# Patient Record
Sex: FEMALE | Race: WHITE | NOT HISPANIC OR LATINO | Employment: UNEMPLOYED | URBAN - METROPOLITAN AREA
[De-identification: names, ages, dates, MRNs, and addresses within clinical notes are randomized per-mention and may not be internally consistent; named-entity substitution may affect disease eponyms.]

---

## 2023-09-20 PROBLEM — Z96.1 PSEUDOPHAKIA OF RIGHT EYE: Status: ACTIVE | Noted: 2023-09-20

## 2023-09-20 PROBLEM — H26.9 CATARACT OF LEFT EYE: Status: ACTIVE | Noted: 2023-09-20

## 2023-09-20 PROBLEM — H40.1133 PRIMARY OPEN ANGLE GLAUCOMA OF BOTH EYES, SEVERE STAGE: Status: ACTIVE | Noted: 2023-09-20

## 2023-09-20 PROBLEM — H02.89 IRRITATION OF EYELID: Status: ACTIVE | Noted: 2023-09-20

## 2023-09-20 RX ORDER — DORZOLAMIDE HYDROCHLORIDE AND TIMOLOL MALEATE 20; 5 MG/ML; MG/ML
1 SOLUTION/ DROPS OPHTHALMIC 2 TIMES DAILY
COMMUNITY
Start: 2014-03-04 | End: 2023-10-19 | Stop reason: SDUPTHER

## 2023-09-20 RX ORDER — POTASSIUM CHLORIDE 750 MG/1
TABLET, FILM COATED, EXTENDED RELEASE ORAL
COMMUNITY
Start: 2019-10-29

## 2023-09-20 RX ORDER — BRIMONIDINE TARTRATE 1 MG/ML
1 SOLUTION/ DROPS OPHTHALMIC EVERY 12 HOURS
COMMUNITY
Start: 2021-04-08 | End: 2023-10-19 | Stop reason: SDUPTHER

## 2023-09-20 RX ORDER — DILTIAZEM HYDROCHLORIDE 240 MG/1
CAPSULE, EXTENDED RELEASE ORAL
COMMUNITY
Start: 2014-03-04 | End: 2023-10-19 | Stop reason: ALTCHOICE

## 2023-09-20 RX ORDER — NETARSUDIL AND LATANOPROST OPHTHALMIC SOLUTION, 0.02%/0.005% .2; .05 MG/ML; MG/ML
1 SOLUTION/ DROPS OPHTHALMIC; TOPICAL NIGHTLY
COMMUNITY
Start: 2019-10-30 | End: 2023-10-19 | Stop reason: SDUPTHER

## 2023-09-20 RX ORDER — HYDROCHLOROTHIAZIDE 25 MG/1
TABLET ORAL
COMMUNITY
Start: 2014-03-04

## 2023-10-19 ENCOUNTER — OFFICE VISIT (OUTPATIENT)
Dept: OPHTHALMOLOGY | Facility: CLINIC | Age: 78
End: 2023-10-19
Payer: COMMERCIAL

## 2023-10-19 DIAGNOSIS — H40.1133 PRIMARY OPEN ANGLE GLAUCOMA (POAG) OF BOTH EYES, SEVERE STAGE: Primary | ICD-10-CM

## 2023-10-19 DIAGNOSIS — Z96.1 PSEUDOPHAKIA OF BOTH EYES: ICD-10-CM

## 2023-10-19 PROCEDURE — 92133 CPTRZD OPH DX IMG PST SGM ON: CPT | Performed by: OPHTHALMOLOGY

## 2023-10-19 PROCEDURE — 99214 OFFICE O/P EST MOD 30 MIN: CPT | Performed by: OPHTHALMOLOGY

## 2023-10-19 PROCEDURE — 92083 EXTENDED VISUAL FIELD XM: CPT | Performed by: OPHTHALMOLOGY

## 2023-10-19 RX ORDER — NETARSUDIL AND LATANOPROST OPHTHALMIC SOLUTION, 0.02%/0.005% .2; .05 MG/ML; MG/ML
1 SOLUTION/ DROPS OPHTHALMIC; TOPICAL NIGHTLY
Qty: 3.75 ML | Refills: 11 | Status: SHIPPED | OUTPATIENT
Start: 2023-10-19 | End: 2024-10-13

## 2023-10-19 RX ORDER — BRIMONIDINE TARTRATE 1 MG/ML
1 SOLUTION/ DROPS OPHTHALMIC EVERY 12 HOURS
Qty: 3 ML | Refills: 11 | Status: SHIPPED | OUTPATIENT
Start: 2023-10-19 | End: 2023-10-25 | Stop reason: SDUPTHER

## 2023-10-19 RX ORDER — DORZOLAMIDE HYDROCHLORIDE AND TIMOLOL MALEATE 20; 5 MG/ML; MG/ML
1 SOLUTION/ DROPS OPHTHALMIC 2 TIMES DAILY
Qty: 3 ML | Refills: 11 | Status: SHIPPED | OUTPATIENT
Start: 2023-10-19 | End: 2024-10-18

## 2023-10-19 ASSESSMENT — PACHYMETRY
OS_CT(UM): 480
OD_CT(UM): 485

## 2023-10-19 ASSESSMENT — VISUAL ACUITY
OS_PH_CC: 20/20
OS_CC: 20/25
OD_CC: 20/20
METHOD: SNELLEN - LINEAR
OS_PH_CC+: -1
OS_CC+: +3
OD_CC+: -1

## 2023-10-19 ASSESSMENT — TONOMETRY
OS_IOP_MMHG: 08
IOP_METHOD: GOLDMANN APPLANATION
OD_IOP_MMHG: 07

## 2023-10-19 ASSESSMENT — CONF VISUAL FIELD
OD_INFERIOR_NASAL_RESTRICTION: 1
OS_SUPERIOR_TEMPORAL_RESTRICTION: 1
OS_SUPERIOR_NASAL_RESTRICTION: 1
OD_INFERIOR_TEMPORAL_RESTRICTION: 3

## 2023-10-19 ASSESSMENT — CUP TO DISC RATIO
OS_RATIO: 0.9
OD_RATIO: 0.9

## 2023-10-19 ASSESSMENT — SLIT LAMP EXAM - LIDS
COMMENTS: NORMAL
COMMENTS: NORMAL

## 2023-10-19 ASSESSMENT — EXTERNAL EXAM - LEFT EYE: OS_EXAM: NORMAL

## 2023-10-19 ASSESSMENT — EXTERNAL EXAM - RIGHT EYE: OD_EXAM: NORMAL

## 2023-10-19 NOTE — PROGRESS NOTES
Assessment/Plan   Last dilated:  10/19/23    1.  Primary Open-Angle Glaucoma OU:   /480.  Pt has friend who had negative outcome with trab, so she is warry of trab.  s/p combined with trabectome OD 6/16/15 (pre-op VA 20/30, IOP 12 mmHg).  s/p combined with trabectome OS 1/18/17:  pre-op VA 20/25, IOP 18 mmHg.  IOP is stable and well controlled, but pt is developing a follicular conjunctivitis which is most likely from the brimonidine.  Will switch from generic (i.e. 0.15% conc) to alphagan-P which is lower conc, with an alternative preservative, and has been shown in clinical studies to have a lower allergy rates.    Plan:  cont rocklatan OU QHS           cont dorzolamide/timolol OU BID           cont alphagan-P 0.1% OU BID           f/u Dr. Patiño in 3 months - target IOP < 12 mmHg           f/u Paolo 6 months                 2.  Pseudophakia (PCIOL) OU:  min PCO OU - not likely to be visually significant.        Plan:  monitor    3.  Epiretinal Membrane with Pseudohole OU- no SWR possibly some effect on VA OD, but may improve with refraction and also VA not bad enough yet where we would consider membrane peel      Plan:  pt education    4.  Blepharitis:  symptoms controlled     Plan:  as per Dr. Kaylyn Patiño MD  [address] [place] [address]

## 2023-10-24 ENCOUNTER — PATIENT MESSAGE (OUTPATIENT)
Dept: OPHTHALMOLOGY | Facility: CLINIC | Age: 78
End: 2023-10-24
Payer: COMMERCIAL

## 2023-10-24 DIAGNOSIS — H40.1133 PRIMARY OPEN ANGLE GLAUCOMA (POAG) OF BOTH EYES, SEVERE STAGE: ICD-10-CM

## 2023-10-30 RX ORDER — BRIMONIDINE TARTRATE 1 MG/ML
1 SOLUTION/ DROPS OPHTHALMIC 3 TIMES DAILY
Qty: 5 ML | Refills: 10 | Status: SHIPPED | OUTPATIENT
Start: 2023-10-30 | End: 2023-11-09 | Stop reason: ENTERED-IN-ERROR

## 2023-10-30 RX ORDER — BRIMONIDINE TARTRATE 1 MG/ML
1 SOLUTION/ DROPS OPHTHALMIC 2 TIMES DAILY
Qty: 3 ML | Refills: 11 | Status: SHIPPED | OUTPATIENT
Start: 2023-10-30 | End: 2023-11-09 | Stop reason: SDUPTHER

## 2023-10-30 RX ORDER — BRIMONIDINE TARTRATE 1 MG/ML
1 SOLUTION/ DROPS OPHTHALMIC EVERY 12 HOURS
Qty: 3 ML | Refills: 11 | Status: SHIPPED | OUTPATIENT
Start: 2023-10-30 | End: 2023-11-09 | Stop reason: ENTERED-IN-ERROR

## 2023-11-09 DIAGNOSIS — H40.1133 PRIMARY OPEN ANGLE GLAUCOMA (POAG) OF BOTH EYES, SEVERE STAGE: ICD-10-CM

## 2023-11-10 RX ORDER — BRIMONIDINE TARTRATE 1 MG/ML
1 SOLUTION/ DROPS OPHTHALMIC 2 TIMES DAILY
Qty: 3 ML | Refills: 11 | Status: SHIPPED | OUTPATIENT
Start: 2023-11-10 | End: 2023-11-13 | Stop reason: SINTOL

## 2023-11-13 DIAGNOSIS — H40.1133 PRIMARY OPEN ANGLE GLAUCOMA (POAG) OF BOTH EYES, SEVERE STAGE: ICD-10-CM

## 2023-11-13 RX ORDER — BRIMONIDINE TARTRATE 1 MG/ML
1 SOLUTION/ DROPS OPHTHALMIC 2 TIMES DAILY
Qty: 3 ML | Refills: 11 | Status: SHIPPED | OUTPATIENT
Start: 2023-11-13 | End: 2024-01-02

## 2023-12-31 DIAGNOSIS — H40.1133 PRIMARY OPEN ANGLE GLAUCOMA (POAG) OF BOTH EYES, SEVERE STAGE: ICD-10-CM

## 2024-01-02 RX ORDER — BRIMONIDINE TARTRATE 1 MG/ML
1 SOLUTION/ DROPS OPHTHALMIC 2 TIMES DAILY
Qty: 10 ML | Refills: 0 | Status: SHIPPED | OUTPATIENT
Start: 2024-01-02 | End: 2024-05-02 | Stop reason: SDUPTHER

## 2024-04-18 ENCOUNTER — OFFICE VISIT (OUTPATIENT)
Dept: OPHTHALMOLOGY | Facility: CLINIC | Age: 79
End: 2024-04-18
Payer: COMMERCIAL

## 2024-04-18 DIAGNOSIS — H40.1133 PRIMARY OPEN ANGLE GLAUCOMA (POAG) OF BOTH EYES, SEVERE STAGE: Primary | ICD-10-CM

## 2024-04-18 DIAGNOSIS — Z96.1 PSEUDOPHAKIA OF BOTH EYES: ICD-10-CM

## 2024-04-18 PROCEDURE — 99213 OFFICE O/P EST LOW 20 MIN: CPT | Performed by: OPHTHALMOLOGY

## 2024-04-18 ASSESSMENT — EXTERNAL EXAM - LEFT EYE: OS_EXAM: NORMAL

## 2024-04-18 ASSESSMENT — TONOMETRY
OD_IOP_MMHG: 09
IOP_METHOD: GOLDMANN APPLANATION
OS_IOP_MMHG: 10

## 2024-04-18 ASSESSMENT — CUP TO DISC RATIO
OS_RATIO: 0.9
OD_RATIO: 0.9

## 2024-04-18 ASSESSMENT — REFRACTION_WEARINGRX
OD_AXIS: 105
OS_ADD: +2.50
OD_CYLINDER: -0.50
OS_AXIS: 052
OD_SPHERE: +0.50
OS_CYLINDER: -0.50
OD_ADD: +2.50
OS_SPHERE: PLANO

## 2024-04-18 ASSESSMENT — PACHYMETRY
OS_CT(UM): 480
OD_CT(UM): 485

## 2024-04-18 ASSESSMENT — VISUAL ACUITY
OS_CC+: -2
METHOD: SNELLEN - LINEAR
OS_CC: 20/25
CORRECTION_TYPE: GLASSES
OD_CC: 20/20

## 2024-04-18 ASSESSMENT — CONF VISUAL FIELD
OS_SUPERIOR_TEMPORAL_RESTRICTION: 0
METHOD: COUNTING FINGERS
OS_NORMAL: 1
OD_INFERIOR_TEMPORAL_RESTRICTION: 0
OS_INFERIOR_NASAL_RESTRICTION: 0
OS_SUPERIOR_NASAL_RESTRICTION: 0
OS_INFERIOR_TEMPORAL_RESTRICTION: 0
OD_SUPERIOR_TEMPORAL_RESTRICTION: 0
OD_INFERIOR_NASAL_RESTRICTION: 0
OD_NORMAL: 1
OD_SUPERIOR_NASAL_RESTRICTION: 0

## 2024-04-18 ASSESSMENT — EXTERNAL EXAM - RIGHT EYE: OD_EXAM: NORMAL

## 2024-04-18 ASSESSMENT — SLIT LAMP EXAM - LIDS
COMMENTS: NORMAL
COMMENTS: NORMAL

## 2024-04-18 NOTE — PROGRESS NOTES
Visual Acuity (Snellen - Linear)         Right Left    Dist cc 20/20 20/25 -2      Correction: Glasses          Tonometry       Tonometry (Goldmann Applanation, 10:12 AM)         Right Left    Pressure 09 10                  Assessment/Plan   Last dilated:  10/19/23    1.  Primary Open-Angle Glaucoma OU:   /480.  Pt has friend who had negative outcome with trab, so she is warry of trab.  s/p combined with trabectome OD 6/16/15 (pre-op VA 20/30, IOP 12 mmHg).  s/p combined with trabectome OS 1/18/17:  pre-op VA 20/25, IOP 18 mmHg.  IOP is stable and well controlled, but pt is developing a follicular conjunctivitis which is most likely from the brimonidine.  Will switch from generic (i.e. 0.15% conc) to alphagan-P which is lower conc, with an alternative preservative, and has been shown in clinical studies to have a lower allergy rates.    Plan:  cont rocklatan OU QHS           cont dorzolamide/timolol OU BID           cont alphagan-P 0.1% OU BID           f/u Dr. Patiño in 3 months - target IOP < 12 mmHg           f/u Paolo 6 months (HVF, dilation, RNFL)                2.  Pseudophakia (PCIOL) OU:  min PCO OU - not likely to be visually significant.        Plan:  monitor    3.  Epiretinal Membrane with Pseudohole OU- no SWR possibly some effect on VA OD, but may improve with refraction and also VA not bad enough yet where we would consider membrane peel      Plan:  pt education    4.  Blepharitis:  symptoms controlled     Plan:  as per Dr. Kaylyn Patiño MD  [address] [place] [id number]

## 2024-05-02 DIAGNOSIS — H40.1133 PRIMARY OPEN ANGLE GLAUCOMA (POAG) OF BOTH EYES, SEVERE STAGE: ICD-10-CM

## 2024-05-02 RX ORDER — BRIMONIDINE TARTRATE 1 MG/ML
1 SOLUTION/ DROPS OPHTHALMIC 2 TIMES DAILY
Qty: 10 ML | Refills: 11 | Status: SHIPPED | OUTPATIENT
Start: 2024-05-02

## 2024-07-26 DIAGNOSIS — H40.1133 PRIMARY OPEN ANGLE GLAUCOMA (POAG) OF BOTH EYES, SEVERE STAGE: Primary | ICD-10-CM

## 2024-07-26 RX ORDER — LATANOPROST 50 UG/ML
1 SOLUTION/ DROPS OPHTHALMIC NIGHTLY
Qty: 2.5 ML | Refills: 11 | Status: SHIPPED | OUTPATIENT
Start: 2024-07-26 | End: 2024-08-25

## 2024-08-26 DIAGNOSIS — H40.1133 PRIMARY OPEN ANGLE GLAUCOMA (POAG) OF BOTH EYES, SEVERE STAGE: ICD-10-CM

## 2024-08-26 RX ORDER — NETARSUDIL AND LATANOPROST OPHTHALMIC SOLUTION, 0.02%/0.005% .2; .05 MG/ML; MG/ML
1 SOLUTION/ DROPS OPHTHALMIC; TOPICAL NIGHTLY
Qty: 3.75 ML | Refills: 11 | Status: SHIPPED | OUTPATIENT
Start: 2024-08-26 | End: 2025-08-21

## 2024-10-24 ENCOUNTER — APPOINTMENT (OUTPATIENT)
Dept: OPHTHALMOLOGY | Facility: CLINIC | Age: 79
End: 2024-10-24
Payer: COMMERCIAL

## 2024-10-24 DIAGNOSIS — Z96.1 PSEUDOPHAKIA OF BOTH EYES: ICD-10-CM

## 2024-10-24 DIAGNOSIS — H40.1133 PRIMARY OPEN ANGLE GLAUCOMA (POAG) OF BOTH EYES, SEVERE STAGE: Primary | ICD-10-CM

## 2024-10-24 PROCEDURE — 1159F MED LIST DOCD IN RCRD: CPT | Performed by: OPHTHALMOLOGY

## 2024-10-24 PROCEDURE — 92133 CPTRZD OPH DX IMG PST SGM ON: CPT | Performed by: OPHTHALMOLOGY

## 2024-10-24 PROCEDURE — 92083 EXTENDED VISUAL FIELD XM: CPT | Performed by: OPHTHALMOLOGY

## 2024-10-24 PROCEDURE — 99214 OFFICE O/P EST MOD 30 MIN: CPT | Performed by: OPHTHALMOLOGY

## 2024-10-24 ASSESSMENT — VISUAL ACUITY
CORRECTION_TYPE: GLASSES
OS_CC: 20/20
OS_CC+: -2
METHOD: SNELLEN - LINEAR
OD_CC: 20/25

## 2024-10-24 ASSESSMENT — ENCOUNTER SYMPTOMS: EYES NEGATIVE: 1

## 2024-10-24 ASSESSMENT — PACHYMETRY
OS_CT(UM): 480
OD_CT(UM): 485

## 2024-10-24 ASSESSMENT — TONOMETRY
IOP_METHOD: GOLDMANN APPLANATION
OS_IOP_MMHG: 09
OD_IOP_MMHG: 08

## 2024-10-24 ASSESSMENT — CUP TO DISC RATIO
OD_RATIO: 0.9
OS_RATIO: 0.9

## 2024-10-24 ASSESSMENT — CONF VISUAL FIELD
OS_SUPERIOR_TEMPORAL_RESTRICTION: 1
OD_SUPERIOR_TEMPORAL_RESTRICTION: 2
OD_INFERIOR_NASAL_RESTRICTION: 1
OS_SUPERIOR_NASAL_RESTRICTION: 1

## 2024-10-24 ASSESSMENT — EXTERNAL EXAM - RIGHT EYE: OD_EXAM: NORMAL

## 2024-10-24 ASSESSMENT — SLIT LAMP EXAM - LIDS
COMMENTS: NORMAL
COMMENTS: NORMAL

## 2024-10-24 ASSESSMENT — EXTERNAL EXAM - LEFT EYE: OS_EXAM: NORMAL

## 2025-01-08 DIAGNOSIS — H40.1133 PRIMARY OPEN ANGLE GLAUCOMA (POAG) OF BOTH EYES, SEVERE STAGE: ICD-10-CM

## 2025-01-08 RX ORDER — DORZOLAMIDE HYDROCHLORIDE AND TIMOLOL MALEATE 20; 5 MG/ML; MG/ML
1 SOLUTION/ DROPS OPHTHALMIC 2 TIMES DAILY
COMMUNITY
End: 2025-01-08 | Stop reason: SDUPTHER

## 2025-01-08 RX ORDER — DORZOLAMIDE HYDROCHLORIDE AND TIMOLOL MALEATE 20; 5 MG/ML; MG/ML
1 SOLUTION/ DROPS OPHTHALMIC 2 TIMES DAILY
Qty: 7.5 ML | Refills: 3 | Status: SHIPPED | OUTPATIENT
Start: 2025-01-08 | End: 2025-02-07

## 2025-04-17 ENCOUNTER — APPOINTMENT (OUTPATIENT)
Dept: OPHTHALMOLOGY | Facility: CLINIC | Age: 80
End: 2025-04-17
Payer: COMMERCIAL

## 2025-04-17 DIAGNOSIS — H40.1133 PRIMARY OPEN ANGLE GLAUCOMA (POAG) OF BOTH EYES, SEVERE STAGE: Primary | ICD-10-CM

## 2025-04-17 DIAGNOSIS — Z96.1 PSEUDOPHAKIA OF BOTH EYES: ICD-10-CM

## 2025-04-17 PROCEDURE — 99213 OFFICE O/P EST LOW 20 MIN: CPT | Performed by: OPHTHALMOLOGY

## 2025-04-17 PROCEDURE — 92020 GONIOSCOPY: CPT | Performed by: OPHTHALMOLOGY

## 2025-04-17 RX ORDER — NETARSUDIL AND LATANOPROST OPHTHALMIC SOLUTION, 0.02%/0.005% .2; .05 MG/ML; MG/ML
1 SOLUTION/ DROPS OPHTHALMIC; TOPICAL NIGHTLY
Qty: 3.75 ML | Refills: 11 | Status: SHIPPED | OUTPATIENT
Start: 2025-04-17 | End: 2026-04-12

## 2025-04-17 RX ORDER — DORZOLAMIDE HYDROCHLORIDE AND TIMOLOL MALEATE 20; 5 MG/ML; MG/ML
1 SOLUTION/ DROPS OPHTHALMIC 2 TIMES DAILY
COMMUNITY
Start: 2025-04-14 | End: 2025-04-17 | Stop reason: SDUPTHER

## 2025-04-17 RX ORDER — METHYLPREDNISOLONE 4 MG
1 TABLET, DOSE PACK ORAL DAILY
COMMUNITY

## 2025-04-17 RX ORDER — BRIMONIDINE TARTRATE 1 MG/ML
1 SOLUTION/ DROPS OPHTHALMIC 2 TIMES DAILY
Qty: 10 ML | Refills: 11 | Status: SHIPPED | OUTPATIENT
Start: 2025-04-17

## 2025-04-17 RX ORDER — DORZOLAMIDE HYDROCHLORIDE AND TIMOLOL MALEATE 20; 5 MG/ML; MG/ML
1 SOLUTION/ DROPS OPHTHALMIC 2 TIMES DAILY
Qty: 10 ML | Refills: 11 | Status: SHIPPED | OUTPATIENT
Start: 2025-04-17

## 2025-04-17 RX ORDER — METOPROLOL SUCCINATE 50 MG/1
50 TABLET, EXTENDED RELEASE ORAL DAILY
COMMUNITY

## 2025-04-17 RX ORDER — LOSARTAN POTASSIUM 50 MG/1
1 TABLET ORAL
COMMUNITY
Start: 2025-02-28

## 2025-04-17 ASSESSMENT — EXTERNAL EXAM - LEFT EYE: OS_EXAM: NORMAL

## 2025-04-17 ASSESSMENT — TONOMETRY
IOP_METHOD: GOLDMANN APPLANATION
OS_IOP_MMHG: 8
OD_IOP_MMHG: 7

## 2025-04-17 ASSESSMENT — CUP TO DISC RATIO
OS_RATIO: 0.9
OD_RATIO: 0.9

## 2025-04-17 ASSESSMENT — PACHYMETRY
OD_CT(UM): 485
OS_CT(UM): 480

## 2025-04-17 ASSESSMENT — VISUAL ACUITY
CORRECTION_TYPE: GLASSES
METHOD: SNELLEN - LINEAR
OD_CC: 20/20-1
OS_CC: 20/25+1

## 2025-04-17 ASSESSMENT — GONIOSCOPY
OS_TEMPORAL: D45R SL PTM
OD_SUPERIOR: D45R SL PTM
OD_TEMPORAL: D45R SL PTM
OS_NASAL: D45R SL PTM
OD_INFERIOR: D45R SL PTM
OS_INFERIOR: D45R SL PTM
OD_NASAL: D45R SL PTM
OS_SUPERIOR: D45R SL PTM

## 2025-04-17 ASSESSMENT — CONF VISUAL FIELD
OD_INFERIOR_NASAL_RESTRICTION: 0
OD_INFERIOR_TEMPORAL_RESTRICTION: 0
COMMENTS: SEE HVF
OD_SUPERIOR_NASAL_RESTRICTION: 0
OD_SUPERIOR_TEMPORAL_RESTRICTION: 0

## 2025-04-17 ASSESSMENT — SLIT LAMP EXAM - LIDS
COMMENTS: NORMAL
COMMENTS: NORMAL

## 2025-04-17 ASSESSMENT — ENCOUNTER SYMPTOMS: EYES NEGATIVE: 1

## 2025-04-17 ASSESSMENT — EXTERNAL EXAM - RIGHT EYE: OD_EXAM: NORMAL

## 2025-04-17 NOTE — PROGRESS NOTES
Visual Acuity (Snellen - Linear)         Right Left    Dist cc 20/20-1 20/25+1      Correction: Glasses          Tonometry       Tonometry (Goldmann Applanation, 8:33 AM)         Right Left    Pressure 7 8                  Assessment/Plan   Last dilated:  10/24/24  Last gonio 4/17/25 OD:  D45r sl PTM    OS:  D45r sl PTM    1.  Primary Open-Angle Glaucoma OU:   /480.  Pt has friend who had negative outcome with trab, so she is warry of trab.  s/p combined with trabectome OD 6/16/15 (pre-op VA 20/30, IOP 12 mmHg).  s/p combined with trabectome OS 1/18/17:  pre-op VA 20/25, IOP 18 mmHg.  IOP is stable and well controlled, but pt is developing a follicular conjunctivitis which is most likely from the brimonidine.  Will switch from generic (i.e. 0.15% conc) to alphagan-P which is lower conc, with an alternative preservative, and has been shown in clinical studies to have a lower allergy rates.    Plan:  cont rocklatan OU QHS           cont dorzolamide/timolol OU BID           cont alphagan-P 0.1% OU BID           f/u Dr. Patiño in 3 months - target IOP < 12 mmHg           f/u Paolo 6 months (HVF, dilation, RNFL)                2.  Pseudophakia (PCIOL) OU:  min PCO OU - not likely to be visually significant.        Plan:  monitor    3.  Epiretinal Membrane with Pseudohole OU- no SWR possibly some effect on VA OD, but may improve with refraction and also VA not bad enough yet where we would consider membrane peel      Plan:  pt education    4.  Blepharitis:  symptoms controlled     Plan:  as per Dr. Kaylyn Patiño MD  [address] [place] [address]

## 2025-10-16 ENCOUNTER — APPOINTMENT (OUTPATIENT)
Dept: OPHTHALMOLOGY | Facility: CLINIC | Age: 80
End: 2025-10-16
Payer: MEDICARE

## 2025-10-23 ENCOUNTER — APPOINTMENT (OUTPATIENT)
Dept: OPHTHALMOLOGY | Facility: CLINIC | Age: 80
End: 2025-10-23
Payer: MEDICARE